# Patient Record
Sex: MALE
[De-identification: names, ages, dates, MRNs, and addresses within clinical notes are randomized per-mention and may not be internally consistent; named-entity substitution may affect disease eponyms.]

---

## 2017-12-24 ENCOUNTER — TRANSCRIPTION ENCOUNTER (OUTPATIENT)
Age: 52
End: 2017-12-24

## 2021-02-22 ENCOUNTER — TRANSCRIPTION ENCOUNTER (OUTPATIENT)
Age: 56
End: 2021-02-22

## 2021-03-09 ENCOUNTER — APPOINTMENT (OUTPATIENT)
Dept: DISASTER EMERGENCY | Facility: CLINIC | Age: 56
End: 2021-03-09

## 2021-03-11 DIAGNOSIS — Z01.818 ENCOUNTER FOR OTHER PREPROCEDURAL EXAMINATION: ICD-10-CM

## 2021-03-12 ENCOUNTER — APPOINTMENT (OUTPATIENT)
Dept: DISASTER EMERGENCY | Facility: CLINIC | Age: 56
End: 2021-03-12

## 2021-03-13 LAB — SARS-COV-2 N GENE NPH QL NAA+PROBE: NOT DETECTED

## 2021-03-15 ENCOUNTER — APPOINTMENT (OUTPATIENT)
Dept: PULMONOLOGY | Facility: CLINIC | Age: 56
End: 2021-03-15
Payer: COMMERCIAL

## 2021-03-15 VITALS
HEIGHT: 71 IN | TEMPERATURE: 98.2 F | DIASTOLIC BLOOD PRESSURE: 75 MMHG | WEIGHT: 205 LBS | SYSTOLIC BLOOD PRESSURE: 118 MMHG | RESPIRATION RATE: 18 BRPM | BODY MASS INDEX: 28.7 KG/M2 | OXYGEN SATURATION: 95 % | HEART RATE: 75 BPM

## 2021-03-15 DIAGNOSIS — Z00.00 ENCOUNTER FOR GENERAL ADULT MEDICAL EXAMINATION W/OUT ABNORMAL FINDINGS: ICD-10-CM

## 2021-03-15 PROCEDURE — 94729 DIFFUSING CAPACITY: CPT

## 2021-03-15 PROCEDURE — 99203 OFFICE O/P NEW LOW 30 MIN: CPT | Mod: 25

## 2021-03-15 PROCEDURE — ZZZZZ: CPT

## 2021-03-15 PROCEDURE — 99072 ADDL SUPL MATRL&STAF TM PHE: CPT

## 2021-03-15 PROCEDURE — 94010 BREATHING CAPACITY TEST: CPT

## 2021-03-15 PROCEDURE — 94726 PLETHYSMOGRAPHY LUNG VOLUMES: CPT

## 2021-03-15 NOTE — HISTORY OF PRESENT ILLNESS
[TextBox_4] : Patient recently seen in [facility]. Summary below:\par \par \par Patient Name: Pawel Keller \par : 1965 \par Gender: Male \par MRN: [id number] \par External Visit ID:  \par Visit Date: 2021 \par DISCHARGE DATE: 15:17:26 \par Visit Locked Date Time: [id number] \par Physician: Barbra Cuellar  \par Resource: Barbra Cuellar  \par LOCKED NOTES \par \par REASON FOR APPOINTMENT \par     1. Blood in phlegm \par HISTORY OF PRESENT ILLNESS \par   URI: \par    54 yo male with PMHx of Covid-19 last month reports one episode \par     of cough this morning. Pt states that there was blood tinge in \par     his sputum. Denies blood clots. Pt otherwise feels well. Denies \par     h/o TB. Born in [place], immigrated when he was 6yo. 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 Pt had neg CXR 2017. \par CURRENT MEDICATIONS \par     Taking Atorvastatin Calcium \par     Not-Taking/PRN Bromfed DM 30-2-10 MG/5ML Syrup 10 ml as needed \par     Orally every 6 hrs \par     Not-Taking/PRN Guaifenesin 100 MG/5ML Solution 10 ml as needed \par     Orally every 4 hrs \par     Not-Taking/PRN Ventolin  (90 Base) MCG/ACT Aerosol \par     Solution 2 puffs as needed Inhalation every 6 hrs \par     Not-Taking/PRN Singulair 10 MG Tablet 1 tablet in the evening \par     Orally Once a day \par     Not-Taking/PRN PredniSONE 20 MG Tablet 1 tablet Orally Once a day \par     Not-Taking/PRN Zithromax Z-Herberth 250 MG Tablet 2 tablets on the \par     first day, then 1 tablet daily for 4 days Orally Once a day \par     Not-Taking/PRN Naproxen \par     Medication List reviewed and reconciled with the patient \par PAST MEDICAL HISTORY \par     Hyperlipidemia \par     Plantar fasciitis \par     COVID-19 \par ALLERGIES \par     N.K.D.A. \par FAMILY HISTORY \par     Mother: , diagnosed with Hypertension \par     Father: , Other \par     Siblings: alive, Heart Disease \par SOCIAL HISTORY \par      - Tobacco Use: \par     Tobacco Use/SmokingYou are aformer smoker , How long has it been \par     since you last smoked?> 10 years. \par     Smoking Cessation MaterialsPatient counseled on the dangers of \par     smoking and urged to quit: N/A. \par      - Coronavirus: \par     [place] [employer]er Name or School District work from \par     home . \par REVIEW OF SYSTEMS \par   Constitutional: \par     fever  denies . chills  denies . body aches  denies . \par   ENT: \par     sore throat  denies . inability to tolerate fluids  denies . \par     nasal congestion  denies . \par   Cardiovascular: \par     chest pain  denies . palpitations  denies . \par   Respiratory: \par     SOB  none . wheezing  none . \par   Gastrointestinal: \par     abdominal pain  denies . nausea  none . vomiting  none . \par     diarrhea  none . constipation  denies . \par VITAL SIGNS \par     Temp 97 F, HR 84 /min, /98 mm Hg, repeat:135/83, RR 16 \par     /min, Oxygen sat % 98 %, Ht 71.5 in, Wt 185 lbs, Wt-kg 83.92 kg, \par     BMI 25.44 Index, Ht-cm 181.61 cm. \par EXAMINATION \par   General PE: \par     GENERAL:no acute distress, A \par     afebrile. \par     EARS/NOSE:canal clear without cerumen impaction, No redness, \par     normal landmarks and light reflexes, tympanic membranes clear \par     bilaterally, nasal mucosa normal, no epistaxis noted. \par     MOUTH/THROATmoist mucous membranes, no erythema, uvula midline, \par     no tonsillar edema noted, no exudates, no ulcers or other \par     lesions, no drooling or stridor, no PTA. \par     LUNGS:clear to auscultation, No respiratory distress, No rales, \par     No rhonchi, No wheezing, Chest wall non tender. \par     PSYCHIATRIC:affect full , Interactive, conversant, A \par     LYMPHATICno cervical lymphadenopathy. \par     HEAD:normocephalic, atraumatic. \par     SKIN/Woundwarm, dry, no rash on visible skin. \par     EYES:both eyes, sclera non-icteric, no conjunctival injection, \par     pupils equal, round, Pupils constrict to light bilaterally, EOMI. \par     NECKnon-tender, no posterior midline point tenderness, full \par     active ROM without pain, no unusual lymph node swelling or \par     tenderness, trachea midline, no crepitance, no swelling noted. \par     HEART:regular rate and rhythm. \par     NEUROLOGIC:appears alert and oriented x3, speech/vocalization \par     normal for age, moves all 4 extremities, gait grossly normal. \par ASSESSMENTS \par     Hemoptysis - R04.2 (Primary) \par     Encounter for laboratory testing for COVID-19 virus - Z20.822 \par     Elevated BP without diagnosis of hypertension - R03.0 \par TREATMENT \par   Hemoptysis \par     X ray : CHEST PA LATERAL 2 [id number],Jennifer M 2021 \par     3:10:34 PM > CXR 2 views wet reading no PTX, infiltrate, or \par     effusion. will wait for official report. \par     Notes: Rest and hydration. \par     CXR wet reading neg. Pending radiology report. \par     Tylenol prn pain or fevers. \par     ED with any new or worsening of symptoms. \par     F/U with your PCP. \par     Please f/u with Pulmonologist. Referral placed. \par     RTC prn. \par     Infection control discussed. \par    [facility] \par    REASON:hemoptysis \par   Encounter for laboratory testing for COVID-19 virus \par     LAB: Covid-19 PCR - [facility] \par     LAB: Molecular COVID-19 \par     Negative COVID19   Negative   () \par     \par     Barbra Cuellar 2021 3:01:46 PM > \par   Others \par     Notes: EXPLANATION OF ALL POSSIBLE COVID TESTS: \par     Nasal Swab (PCR test )/RAPID COVID TESTS: this test detects the \par     virus and is a sign of an active infection. This test is used to \par     diagnose COVID-19. New research shows that you do not need to \par     have any signs of being sick to be infected. You can give the \par     virus to others without knowing. \par     Results that are sent to the lab and can take 2-5 days to return \par     depending on lab capabilities and surge volume. \par     Rapid Testing Results are performed and resulted during your \par     visit in office. Depending on your medical presentation your \par     provider may send an additional PCR confirmatory test to the lab. \par     Positive Results: the virus was found in the nasal passage and \par     you are infected with COVID-19. You should: \par     1. Stay home except to get medical care \par     2. Self Isolate yourself from other people \par     3. Monitor your symptoms and If you have any of these emergency \par     warning signs for COVID-19 get medical attention immediately: \par     ** Trouble breathing \par     ** Persistent pain or pressure in the chest \par     ** New confusion or inability to arouse \par     ** Bluish lips or face \par     Negative Results: the virus was not found. A negative result \par     means you probably were not infected at the time your sample was \par     collected. However, that does not mean you will not get sick. It \par     is possible that you were very early in your infection when your \par     sample was collected and that you could test positive later. Or \par     you could be exposed later and then develop illness. In other \par     words, a negative test result does not mean you won't get sick \par     later. This means you could still spread the virus. Please \par     continue to wear a mask, hand wash, and continue social \par     distancing. The current outpatient treatment for COVID-19 is \par     supportive care. Please contact your provider if you have new or \par     worsening symptoms \par     . \par VISIT CODES \par     51455 URGENT CARE VISIT. \par PROCEDURE CODES \par     35560 SARS-COV-2 COVID-19 AMP PRB \par     79651 X-RAY EXAM CHEST 2 VIEWS \par FOLLOW UP \par     prn \par \par Electronically signed by Barbra Cuellar DO on \par     2021 at 03:25 PM EST \par     \par     \par \par Disclaimer : \par This is a Visit Summary extracted from the [employer] chart. \par It is not a copy of the [employer] progress note. \par \par \par Patient feels great now, although felt bad when he made this appointment a few weeks ago. Had significant dyspnea and cough. Also one episode hemoptysis summrized above. Had CXR, read as normal. \par \par

## 2023-05-31 ENCOUNTER — NON-APPOINTMENT (OUTPATIENT)
Age: 58
End: 2023-05-31